# Patient Record
(demographics unavailable — no encounter records)

---

## 2024-12-03 NOTE — HISTORY OF PRESENT ILLNESS
[de-identified] : 56 yo female here for CPE. Feeling well today, no complaints.   H/o DM. Diet hasn't been great. Walking more. Taking Januvia but causes some diarrhea. Interested in switching. Did also gain weight since last visit.   Following with oral surgeon for lichen planus. Using a medicated mouth wash (dexamethasone BID) which helped. Using twice daily until finished. Next appt in April.   H/o left sided headaches. Saw ENT for this who recommended Neuro evaluation. Pt still having intermittent headaches.

## 2024-12-03 NOTE — HEALTH RISK ASSESSMENT
[Good] : ~his/her~  mood as  good [Patient reported mammogram was normal] : Patient reported mammogram was normal [Patient reported PAP Smear was normal] : Patient reported PAP Smear was normal [Patient reported colonoscopy was normal] : Patient reported colonoscopy was normal [Employed] : employed [0] : 2) Feeling down, depressed, or hopeless: Not at all (0) [PHQ-2 Negative - No further assessment needed] : PHQ-2 Negative - No further assessment needed [Never] : Never [[id number]] : 0 [MammogramDate] : 04/24 [PapSmearDate] : 03/24 [ColonoscopyDate] : 01/22

## 2024-12-03 NOTE — ASSESSMENT
[FreeTextEntry1] : CPE_ Well exam, comprehensive labs ordered. STD panel declined. Mammo up to date. PAP up to date. Colonoscopy up to date. Pt to get copy. Follow up labs, drawn today.

## 2025-02-04 NOTE — HISTORY OF PRESENT ILLNESS
[Home] : at home, [unfilled] , at the time of the visit. [Other Location: e.g. Home (Enter Location, City,State)___] : at [unfilled] [Verbal consent obtained from patient] : the patient, [unfilled] [FreeTextEntry8] : 56 yo female here for acute visit. Reporting she had bad cold a few weeks ago, lasted 2 weeks. Reporting felt achiness, feverish feeling without temp. Still with some lingering congestion, no cold. Had laryngitis, lost voice but that's better. Didn't go to urgent care for evaluation.  Admitting to pain down from neck to her shoulders, and down to her hands for 1 week. When at rest the pain is worse. Has to take 2 naproxen to get relief. Has appt with Dr Roman Rheum 2/28 for follow up for Lupus and to establish care. Pt feels like this is a flare. Still on hydroxychloroquine but off Benlysta for months. Pain down to a 6/10. worse with rest. Better with movement. Pain up to 10/10 at night unless she takes 2 naproxen. Pain wakes her up from sleep. No headaches. No fever currently.

## 2025-02-06 NOTE — HISTORY OF PRESENT ILLNESS
[Home] : at home, [unfilled] , at the time of the visit. [Other Location: e.g. Home (Enter Location, City,State)___] : at [unfilled] [Verbal consent obtained from patient] : the patient, [unfilled] [de-identified] : 54 yo female here for follow up visit.   2/24: Reporting she had bad cold a few weeks ago, lasted 2 weeks. Reporting felt achiness, feverish feeling without temp. Still with some lingering congestion, no cold. Had laryngitis, lost voice but that's better. Didn't go to urgent care for evaluation.  Admitting to pain down from neck to her shoulders, and down to her hands for 1 week. When at rest the pain is worse. Has to take 2 naproxen to get relief. Has appt with Dr Abel Hopkins 2/28 for follow up for Lupus and to establish care. Pt feels like this is a flare. Still on hydroxychloroquine but off Benlysta for months. Pain down to a 6/10. worse with rest. Better with movement. Pain up to 10/10 at night unless she takes 2 naproxen. Pain wakes her up from sleep. No headaches. No fever currently.    2/6: Reporting that she is doing much better. Now with improvement in symptoms. Pain in shoulder down to 4/10. Still with some intermittent tingling in the hand. Took muscle relaxer the last 2 nights and was able to sleep. Still taking naproxen as well which helps. Pt had cervical and shoulder xrays done yesterday, results are still pending. Will also go to lab for strep testing given recent illness but hasn't been able to go yet. Had appts with rheum and ortho spine upcoming.

## 2025-02-28 NOTE — REVIEW OF SYSTEMS
[Wake up at night to urinate  How many times?  ___] : wakes up to urinate [unfilled] times during the night [Slow urine stream] : slow urine stream [Bladder fullness after urinating] : bladder fullness after urinating [Negative] : Heme/Lymph [Incontinence] : no incontinence [Urine Infection (bladder/kidney)] : denies bladder/kidney infections [Pain during urination] : denies pain during urination [Pain at onset of urination] : denies pain during onset of urination [Pain after urination] : denies pain after urination [Blood in urine that you can see] : denies seeing blood in urine [Told you have blood in urine on a urine test] : denies being told that blood was present in a urine test [Discharge from urine canal] : denies discharge from urine canal [History of kidney stones] : denies history of kidney stones [Urine retention] : denies urine retention [Strong urge to urinate] : denies strong urge to urinate [Bladder pressure] : denies bladder pressure [Strain or push to urinate] : denies straining or pushing to urinate [Wait a long time to urinate] : denies waiting a long time to urinate [Interrupted urine stream] : denies interrupted urine stream [Increased pain/discomfort with bladder filling] : denies increased pain/discomfort with bladder filling [Bladder problems as child. If yes, describe..] : denies bladder problems as child [Leakage of urine with straining, coughing, laughing] : denies leakage of urine with straining, coughing, and/or laughing [Unaware of when urine is leaking] : aware of when urine is leaking

## 2025-02-28 NOTE — HISTORY OF PRESENT ILLNESS
[FreeTextEntry1] : Patient comes in with no new voiding complaints. She is voiding with an adequate stream, no flank pain, dysuria or hematuria. She performs Kegel exercises regularly. She reports good fluid intake (3 bottles a day). Renal ultrasound on 2/8/25 revealed a stable simple benign left renal cyst.

## 2025-02-28 NOTE — ASSESSMENT
[FreeTextEntry1] : Patient is stable clinically; she is voiding with excellent bladder emptying. She was encouraged to maintain good fluid intake and to perform Kegel exercises regularly. Her urine was collected for urinalysis and urine culture. If all remains stable, she will follow-up in 1 year. As her renal cyst has remained stable since 2019, we discussed that she will have a renal ultrasound in 2 years.

## 2025-02-28 NOTE — ADDENDUM
[FreeTextEntry1] : Next visit: U/A, U/C, schedule Renal US  Entered by Natali Thomas, acting as scribe and chaperone for Dr. Jose Benitez.   The documentation recorded by the scribe accurately reflects the service I personally performed and the decisions made by me.

## 2025-02-28 NOTE — LETTER BODY
[Dear  ___] : Dear  [unfilled], [Courtesy Letter:] : I had the pleasure of seeing your patient, [unfilled], in my office today. [Please see my note below.] : Please see my note below. [Consult Closing:] : Thank you very much for allowing me to participate in the care of this patient.  If you have any questions, please do not hesitate to contact me. [Sincerely,] : Sincerely, [FreeTextEntry3] : Jose Benitez MD

## 2025-03-03 NOTE — DISCUSSION/SUMMARY
[de-identified] : Chief complaint Neck pain  HPI Patient is a very pleasant 55-year-old who works at [employer] who comes in with neck pain on and off for the last several years.  Patient has lupus.  The last months she has been noticing worsening pain and numbness and tingling on the arm.  She has been doing physical therapy and takes naproxen.  She denies any weakness.  She does have pain to the left shoulder as well.  No issues with gait or balance bowel bladder incontinence.  No issues of fine motor activity.  Her pain is severe affecting her quality of life  Pt is NAD, AAOX3 skin is intact no palpable stepoff 5/5 motor strength BUE SILT C5-T1 BUE POSITIVE SPURLING Negative chavez normal rapid  test equal reflexes bicep/BR/tricep hand wwp bcr  Imaging X-rays cervical spine 4 views which I personally reviewed demonstrate C5-C7 DDD with anterior spurring.  There is loss of disc height at C5-C7.  X-rays of the left shoulder demonstrate left AC arthrosis  Treatment note I discussed at length with the patient into the condition.  The patient presents with neck pain left arm radicular complaints into the shoulder blades and down to the hand.  She does have degenerative changes from C5-C7.  This is an acute on chronic issue with affecting her quality of life.  She has tried physical therapy medications.  Will order an MRI to better evaluate this.  She will continue with the home exercise program.  All the patient's questions were sought answered.

## 2025-03-05 NOTE — HISTORY OF PRESENT ILLNESS
[Home] : at home, [unfilled] , at the time of the visit. [Other Location: e.g. Home (Enter Location, City,State)___] : at [unfilled] [Telehealth (audio & video)] : This visit was provided via telehealth using real-time 2-way audio visual technology. [Verbal consent obtained from patient] : the patient, [unfilled] [de-identified] : 56 yo female here for follow up visit.   2/24: Reporting she had bad cold a few weeks ago, lasted 2 weeks. Reporting felt achiness, feverish feeling without temp. Still with some lingering congestion, no cold. Had laryngitis, lost voice but that's better. Didn't go to urgent care for evaluation.  Admitting to pain down from neck to her shoulders, and down to her hands for 1 week. When at rest the pain is worse. Has to take 2 naproxen to get relief. Has appt with Dr Roman Rheum 2/28 for follow up for Lupus and to establish care. Pt feels like this is a flare. Still on hydroxychloroquine but off Benlysta for months. Pain down to a 6/10. worse with rest. Better with movement. Pain up to 10/10 at night unless she takes 2 naproxen. Pain wakes her up from sleep. No headaches. No fever currently.    2/6: Reporting that she is doing much better. Now with improvement in symptoms. Pain in shoulder down to 4/10. Still with some intermittent tingling in the hand. Took muscle relaxer the last 2 nights and was able to sleep. Still taking naproxen as well which helps. Pt had cervical and shoulder xrays done yesterday, results are still pending. Will also go to lab for strep testing given recent illness but hasn't been able to go yet. Had appts with rheum and ortho spine upcoming.  2/11: Admitting to much improvement in shoulder pain/symptoms, pain btwn 0-2/10 recently. Was feeling hoarse voice this weekend. Temp highest 99. Went to urgent care over the weekend. Strep testing negative, slight redness in her throat. Still with postnasal drip. Got Flonase yesterday, seems to be helping. Admitting to a little bit if heartburn, sensation food coming up at times. Was on naproxen for about a week.   3/5: Pt stating that she is feeling better. Used PPI for a few days with improvement in symptoms. No longer with postnasal drip, sore throat, or hoarseness. Pain in neck and arm have improved but are still there. Not severe. X-rays cervical spine 4 views demonstrate C5-C7 DDD with anterior spurring. There is loss of disc height at C5-C7. X-rays of the left shoulder demonstrate left AC arthrosis. Saw ortho spine recently. Was recommended to go for MRI cervical spine and start PT. Has MRI appt on 3/29.

## 2025-03-21 NOTE — HISTORY OF PRESENT ILLNESS
[postmenopausal] : postmenopausal [N] : Patient does not use contraception [Y] : Patient is sexually active [Currently In Menopause] : currently in menopause [Post-Menopause, No Sxs] : post-menopausal, currently without symptoms [Currently Active] : currently active [Men] : men [No] : No [Patient refuses STI testing] : Patient refuses STI testing [FreeTextEntry1] : Fanta Ceron a 54yo female presents for an annual exam with no complaints.  [Mammogramdate] : 04/26/2024 [PapSmeardate] : 03/15/2024 [ColonoscopyDate] : 04/11/2022

## 2025-03-21 NOTE — PHYSICAL EXAM
[Chaperone Present] : A chaperone was present in the examining room during all aspects of the physical examination [FreeTextEntry2] : Michael [Appropriately responsive] : appropriately responsive [Alert] : alert [No Acute Distress] : no acute distress [No Lymphadenopathy] : no lymphadenopathy [Soft] : soft [Non-tender] : non-tender [Non-distended] : non-distended [Oriented x3] : oriented x3 [FreeTextEntry7] : midline vertical incison [Examination Of The Breasts] : a normal appearance [No Masses] : no breast masses were palpable [Labia Majora] : normal [Labia Minora] : normal [Normal] : normal [Uterine Adnexae] : normal

## 2025-04-07 NOTE — DISCUSSION/SUMMARY
[de-identified] : Chief complaint Follow-up  HPI Patient is here today for follow-up of her neck pain.  She is feeling significantly better.  She has minimal complaints.  She does get some neck stiffness but otherwise is happy with her progress.  She is doing some exercises.  No issues with gait or balance bowel bladder incontinence.  Pt is NAD, AAOX3 skin is intact no palpable stepoff 5/5 motor strength BUE SILT C5-T1 BUE NEGATIVE SPURLING Negative chavez normal rapid  test equal reflexes bicep/BR/tricep hand wwp bcr  Imaging I personally reviewed the MRI of the cervical spine which demonstrate C5-C6 central disc herniation C6-C7 left-sided disc osteophyte complex.  This results in mild to moderate stenosis  Treatment note I discussed at length with the patient into the condition.  The patient presents with neck pain and radiculopathy but now currently is feeling better.  The MRI demonstrates pathology at C5-C6 and C6-C7.  Since her symptoms are minimal at this time, we will recommend continued physical therapy anti-inflammatory use.  She would be a candidate for injections in the future.  She will let us know when this occurs.  All the patient's questions were sought answered.

## 2025-04-18 NOTE — PROCEDURE
[Colposcopy] : Colposcopy  [Abnormal Pap] : abnormal pap [HPV High Risk] : HPV high risk [Time out performed] : Pre-procedure time out performed.  Patient's name, date of birth and procedure confirmed. [Consent Obtained] : Consent obtained [Risks] : risks [Benefits] : benefits [Alternatives] : alternatives [Patient] : patient [Infection] : infection [Bleeding] : bleeding [Allergic Reaction] : allergic reaction [No Premedication] : no premedication [Colposcopy Adequate] : colposcopy adequate [SCI Fully Visualized] : SCI fully visualized [ECC Performed] : ECC performed [No Abnormalities] : no abnormalities [Lesion] : lesion seen [Biopsy] : biopsy taken [Hemostasis Obtained] : Hemostasis obtained [Tolerated Well] : the patient tolerated the procedure well [de-identified] : 2 [de-identified] : 12, o clock, 8 o clock

## 2025-05-06 NOTE — HISTORY OF PRESENT ILLNESS
[FreeTextEntry1] : May 6, 2025 History of SLE Working in infusion at [employer]  mouth is dry Hair thininng Feels sun burning skin Sleeping issues felt tired all the time  Blood tests  Plaquenil 400 mg qday  Since 2017 Sees Dr. Osuna Had surgeruy in 2022 for hernia and abdinmal reconsitruction for 9/2023 Benlysta previously until healed  History of oral licen planus   Medications: plqauneil 400 mg  Aic elevated:   No joint pains or morning stiffess Soemtiems numbenss and tigling which has since reasolved Sometiems knees  Walks and takes the stairs at work SSun exposure with less symtpoms No Raynauds NNo ches tpain or shortness of rbeaht No oral ulcers  When 19, diagnosed with ahsimotos no issues with thyroid at this time since the age of 23 Eyes sometiems dry, dry mouth uses eye drops sometiems Will see ophtlamologist  With Januvia feels extre dry mouth

## 2025-05-09 NOTE — HISTORY OF PRESENT ILLNESS
[Home] : at home, [unfilled] , at the time of the visit. [Other Location: e.g. Home (Enter Location, City,State)___] : at [unfilled] [Telehealth (audio & video)] : This visit was provided via telehealth using real-time 2-way audio visual technology. [de-identified] : 56 yo female here for follow up visit.   H/o lupus. Established with new rheum this week. Will go down to hydroxychloroquine 200 mg daily, instead of BID due to improvement in symptoms. Will follow up with rheum in 4 months.  Saw Rheum who checked Hga1c, which was 12.5. Reports that she hasn't been taking Januvia. Didn't  the ozempic after last visit. Started walking again more, restarted Januvia since seeing these results. Has been eating more sourdough bread. Doesn't eat pasta or rice.   Saw GYN and had abnormal pap in March. Then had negative colposcopy. Will follow up in 1 year.

## 2025-06-10 NOTE — HISTORY OF PRESENT ILLNESS
[Other Location: e.g. Home (Enter Location, City,State)___] : at [unfilled] [Home] : at home, [unfilled] , at the time of the visit. [Telehealth (audio & video)] : This visit was provided via telehealth using real-time 2-way audio visual technology. [Verbal consent obtained from patient] : the patient, [unfilled] [de-identified] : 54 yo female here for follow up visit.   H/o lupus. Established with new rheum this week. Will go down to hydroxychloroquine 200 mg daily, instead of BID due to improvement in symptoms. Will follow up with rheum in 3 months.  Saw Rheum who checked Hga1c in May, which was 12.5. Was off Januvia but restarted since last appt. Started ozempic 0.25 mg weekly last visit, lost 4 lbs so far. Started walking again more. Eating less bread. Doesn't eat pasta or rice.

## 2025-07-17 NOTE — HISTORY OF PRESENT ILLNESS
[Home] : at home, [unfilled] , at the time of the visit. [Other Location: e.g. Home (Enter Location, City,State)___] : at [unfilled] [Telehealth (audio & video)] : This visit was provided via telehealth using real-time 2-way audio visual technology. [Verbal consent obtained from patient] : the patient, [unfilled] [de-identified] : 54 yo female here for follow up visit.   H/o lupus. Established with new rheum recently. Now on hydroxychloroquine 200 mg daily, instead of BID due to improvement in symptoms. Will follow up with rheum in Sept.  Here for follow up for DM. Hga1c in May was 12.5. Was off Januvia but restarted every other day since last appt, sometimes it upsets her stomach. Started ozempic 0.5 mg weekly last visit, lost 6 lbs so far. Started walking again more. Eating less bread. Doesn't eat pasta or rice.